# Patient Record
Sex: FEMALE | URBAN - METROPOLITAN AREA
[De-identification: names, ages, dates, MRNs, and addresses within clinical notes are randomized per-mention and may not be internally consistent; named-entity substitution may affect disease eponyms.]

---

## 2017-06-08 PROBLEM — K76.0 FATTY LIVER DISEASE, NONALCOHOLIC: Status: ACTIVE | Noted: 2017-06-08

## 2017-06-14 PROCEDURE — 80074 ACUTE HEPATITIS PANEL: CPT | Performed by: FAMILY MEDICINE

## 2018-09-12 PROBLEM — M54.50 ACUTE BILATERAL LOW BACK PAIN WITHOUT SCIATICA: Status: ACTIVE | Noted: 2018-09-12

## 2018-09-12 PROBLEM — M54.6 ACUTE BILATERAL THORACIC BACK PAIN: Status: ACTIVE | Noted: 2018-09-12

## 2018-09-12 PROBLEM — M54.2 CERVICALGIA: Status: ACTIVE | Noted: 2018-09-12

## 2018-10-30 PROBLEM — Z28.21 INFLUENZA VACCINATION DECLINED BY PATIENT: Status: ACTIVE | Noted: 2018-10-30

## 2018-10-30 PROBLEM — E55.9 VITAMIN D DEFICIENCY: Status: ACTIVE | Noted: 2018-10-30

## 2018-10-30 PROCEDURE — 86256 FLUORESCENT ANTIBODY TITER: CPT | Performed by: FAMILY MEDICINE

## 2018-10-30 PROCEDURE — 80074 ACUTE HEPATITIS PANEL: CPT | Performed by: FAMILY MEDICINE

## 2018-10-30 PROCEDURE — 83516 IMMUNOASSAY NONANTIBODY: CPT | Performed by: FAMILY MEDICINE

## 2018-11-19 PROBLEM — K75.9 HEPATITIS: Status: ACTIVE | Noted: 2018-11-19

## 2018-11-19 PROBLEM — R74.8 ABNORMAL LIVER ENZYMES: Status: ACTIVE | Noted: 2018-11-19

## 2019-03-18 PROCEDURE — 88307 TISSUE EXAM BY PATHOLOGIST: CPT | Performed by: INTERNAL MEDICINE

## 2019-03-18 PROCEDURE — 88313 SPECIAL STAINS GROUP 2: CPT | Performed by: INTERNAL MEDICINE

## 2019-04-25 PROBLEM — K75.81 NASH (NONALCOHOLIC STEATOHEPATITIS): Status: ACTIVE | Noted: 2019-04-25

## 2019-05-08 PROCEDURE — 87624 HPV HI-RISK TYP POOLED RSLT: CPT | Performed by: FAMILY MEDICINE

## 2019-05-08 PROCEDURE — 88175 CYTOPATH C/V AUTO FLUID REDO: CPT | Performed by: FAMILY MEDICINE

## 2019-09-27 PROCEDURE — 87086 URINE CULTURE/COLONY COUNT: CPT | Performed by: EMERGENCY MEDICINE

## 2019-10-10 PROCEDURE — 87086 URINE CULTURE/COLONY COUNT: CPT | Performed by: FAMILY MEDICINE

## 2022-06-15 ENCOUNTER — OFFICE VISIT (OUTPATIENT)
Dept: URBAN - METROPOLITAN AREA CLINIC 44 | Facility: CLINIC | Age: 57
End: 2022-06-15

## 2022-06-15 DIAGNOSIS — H40.023 OPEN ANGLE WITH BORDERLINE FINDINGS, HIGH RISK, BILATERAL: ICD-10-CM

## 2022-06-15 DIAGNOSIS — H40.033 ANATOMICAL NARROW ANGLE, BILATERAL: Primary | ICD-10-CM

## 2022-06-15 PROCEDURE — 99204 OFFICE O/P NEW MOD 45 MIN: CPT | Performed by: OPHTHALMOLOGY

## 2022-06-15 PROCEDURE — 92020 GONIOSCOPY: CPT | Performed by: OPHTHALMOLOGY

## 2022-06-15 RX ORDER — LATANOPROST 50 UG/ML
0.005 % SOLUTION OPHTHALMIC
Qty: 3 | Refills: 1 | Status: ACTIVE
Start: 2022-06-15

## 2022-06-15 ASSESSMENT — INTRAOCULAR PRESSURE
OS: 42
OD: 18

## 2022-06-15 NOTE — IMPRESSION/PLAN
Impression: Anatomical narrow angle, bilateral: H40.033. Plan: PT IS AT RISK FOR  NAG  OU AND NVG OS       GONIO GRADE:  OD=MEY  OS=PAS       
REFERRED BY RCA, THE PT PRESENTS WITH A BLIND, PAINFUL LEFT EYE ON 6/15/22 PT DENIES SULFA ALLERGY
PT REPORTS RESPIRATORY COMPROMISE
RECOMMEND 1. RECOMMEND LPI OD TO TRY TO PROTECT THE ONLY SEEING EYE, THE PT DEFERS 6/15/22 2. RECOMMEND CONSULT WITH OCULOPLASTICS FOR ENUCLEATION OS TO REDUCE PAIN, PT DEFERS 6/15/22 3. ON 6/15/22 WE  DISCUSSED RISKS VS BENEFITS OF LPI OD. 4. PT DEFERS LPI OD, I DO NOT RECOMMEND LPI OS IN LIGHT OF THE SEVERE NEOVASCULARIZATION OF IRIS AND ANGLE AS WELL AS THE NLP STATUS IN THE LEFT EYE ON PRESENTATION ON 6/15/22 5. ON 6/15/22 THE PT WAS GIVEN WRITTEN LITERATURE ON THE NATURE OF ANGLE CLOSURE 6. START LATANAPROST OS QPM TO TRY TO REDUCE IOP OS 7. AVOID B-BLOCKERS IN LIGHT OF PT'S OXYGEN DEPENDENCE AT NIGHT 8.  F/U IOP CHECK 6-8 WEEKS, THE PT AND HER  ARE AWARE THAT THE LATANAPROST WILL NOT ELIMINATE THE DISCOMFORT IN THE LEFT EYE. THE PT AND HER  ARE AWARE THE LATANAPROST WILL NOT SIGNIFICANTLY REDUCE NOR ELIMINATE THE PAIN IN THE LEFT EYE.